# Patient Record
Sex: FEMALE | Race: WHITE | Employment: FULL TIME | URBAN - METROPOLITAN AREA
[De-identification: names, ages, dates, MRNs, and addresses within clinical notes are randomized per-mention and may not be internally consistent; named-entity substitution may affect disease eponyms.]

---

## 2023-01-13 ENCOUNTER — APPOINTMENT (OUTPATIENT)
Dept: ULTRASOUND IMAGING | Age: 28
End: 2023-01-13
Payer: COMMERCIAL

## 2023-01-13 ENCOUNTER — HOSPITAL ENCOUNTER (EMERGENCY)
Age: 28
Discharge: HOME | End: 2023-01-13
Payer: COMMERCIAL

## 2023-01-13 VITALS
OXYGEN SATURATION: 97 % | RESPIRATION RATE: 14 BRPM | BODY MASS INDEX: 26.37 KG/M2 | TEMPERATURE: 98.3 F | HEIGHT: 67 IN | SYSTOLIC BLOOD PRESSURE: 105 MMHG | HEART RATE: 80 BPM | WEIGHT: 168 LBS | DIASTOLIC BLOOD PRESSURE: 70 MMHG

## 2023-01-13 DIAGNOSIS — R11.2 NAUSEA VOMITING AND DIARRHEA: ICD-10-CM

## 2023-01-13 DIAGNOSIS — R30.0 DYSURIA: ICD-10-CM

## 2023-01-13 DIAGNOSIS — R19.7 NAUSEA VOMITING AND DIARRHEA: ICD-10-CM

## 2023-01-13 DIAGNOSIS — R10.13 ABDOMINAL PAIN, EPIGASTRIC: Primary | ICD-10-CM

## 2023-01-13 LAB
ALBUMIN SERPL-MCNC: 3.9 GM/DL (ref 3.4–5)
ALP BLD-CCNC: 53 IU/L (ref 40–128)
ALT SERPL-CCNC: 18 U/L (ref 10–40)
ANION GAP SERPL CALCULATED.3IONS-SCNC: 10 MMOL/L (ref 4–16)
AST SERPL-CCNC: 17 IU/L (ref 15–37)
BACTERIA: NEGATIVE /HPF
BASOPHILS ABSOLUTE: 0 K/CU MM
BASOPHILS RELATIVE PERCENT: 0.2 % (ref 0–1)
BILIRUB SERPL-MCNC: 0.4 MG/DL (ref 0–1)
BILIRUBIN URINE: ABNORMAL MG/DL
BLOOD, URINE: ABNORMAL
BUN BLDV-MCNC: 9 MG/DL (ref 6–23)
CALCIUM SERPL-MCNC: 8.6 MG/DL (ref 8.3–10.6)
CHLORIDE BLD-SCNC: 104 MMOL/L (ref 99–110)
CLARITY: ABNORMAL
CO2: 23 MMOL/L (ref 21–32)
COLOR: YELLOW
CREAT SERPL-MCNC: 0.8 MG/DL (ref 0.6–1.1)
DIFFERENTIAL TYPE: ABNORMAL
EOSINOPHILS ABSOLUTE: 0.1 K/CU MM
EOSINOPHILS RELATIVE PERCENT: 1.6 % (ref 0–3)
GFR SERPL CREATININE-BSD FRML MDRD: >60 ML/MIN/1.73M2
GLUCOSE BLD-MCNC: 97 MG/DL (ref 70–99)
GLUCOSE, URINE: NEGATIVE MG/DL
HCG QUALITATIVE: NEGATIVE
HCT VFR BLD CALC: 40.2 % (ref 37–47)
HEMOGLOBIN: 13.6 GM/DL (ref 12.5–16)
IMMATURE NEUTROPHIL %: 0.2 % (ref 0–0.43)
KETONES, URINE: 15 MG/DL
LEUKOCYTE ESTERASE, URINE: ABNORMAL
LIPASE: 23 IU/L (ref 13–60)
LYMPHOCYTES ABSOLUTE: 0.6 K/CU MM
LYMPHOCYTES RELATIVE PERCENT: 13.8 % (ref 24–44)
MCH RBC QN AUTO: 31.7 PG (ref 27–31)
MCHC RBC AUTO-ENTMCNC: 33.8 % (ref 32–36)
MCV RBC AUTO: 93.7 FL (ref 78–100)
MONOCYTES ABSOLUTE: 0.3 K/CU MM
MONOCYTES RELATIVE PERCENT: 6 % (ref 0–4)
MUCUS: ABNORMAL HPF
NITRITE URINE, QUANTITATIVE: NEGATIVE
NUCLEATED RBC %: 0 %
PDW BLD-RTO: 11.9 % (ref 11.7–14.9)
PH, URINE: 6 (ref 5–8)
PLATELET # BLD: 162 K/CU MM (ref 140–440)
PMV BLD AUTO: 9 FL (ref 7.5–11.1)
POTASSIUM SERPL-SCNC: 4.1 MMOL/L (ref 3.5–5.1)
PROTEIN UA: ABNORMAL MG/DL
RAPID INFLUENZA  B AGN: NEGATIVE
RAPID INFLUENZA A AGN: NEGATIVE
RBC # BLD: 4.29 M/CU MM (ref 4.2–5.4)
RBC URINE: 6 /HPF (ref 0–6)
SARS-COV-2, NAAT: NOT DETECTED
SEGMENTED NEUTROPHILS ABSOLUTE COUNT: 3.5 K/CU MM
SEGMENTED NEUTROPHILS RELATIVE PERCENT: 78.2 % (ref 36–66)
SODIUM BLD-SCNC: 137 MMOL/L (ref 135–145)
SOURCE: NORMAL
SPECIFIC GRAVITY UA: 1.02 (ref 1–1.03)
SQUAMOUS EPITHELIAL: 28 /HPF
TOTAL IMMATURE NEUTOROPHIL: 0.01 K/CU MM
TOTAL NUCLEATED RBC: 0 K/CU MM
TOTAL PROTEIN: 6.4 GM/DL (ref 6.4–8.2)
TRICHOMONAS: ABNORMAL /HPF
UROBILINOGEN, URINE: 0.2 MG/DL (ref 0.2–1)
WBC # BLD: 4.5 K/CU MM (ref 4–10.5)
WBC UA: 11 /HPF (ref 0–5)

## 2023-01-13 PROCEDURE — 85025 COMPLETE CBC W/AUTO DIFF WBC: CPT

## 2023-01-13 PROCEDURE — 76705 ECHO EXAM OF ABDOMEN: CPT

## 2023-01-13 PROCEDURE — 6370000000 HC RX 637 (ALT 250 FOR IP): Performed by: PHYSICIAN ASSISTANT

## 2023-01-13 PROCEDURE — 83690 ASSAY OF LIPASE: CPT

## 2023-01-13 PROCEDURE — 96372 THER/PROPH/DIAG INJ SC/IM: CPT

## 2023-01-13 PROCEDURE — 87804 INFLUENZA ASSAY W/OPTIC: CPT

## 2023-01-13 PROCEDURE — 6360000002 HC RX W HCPCS: Performed by: PHYSICIAN ASSISTANT

## 2023-01-13 PROCEDURE — 99284 EMERGENCY DEPT VISIT MOD MDM: CPT

## 2023-01-13 PROCEDURE — 80053 COMPREHEN METABOLIC PANEL: CPT

## 2023-01-13 PROCEDURE — 87635 SARS-COV-2 COVID-19 AMP PRB: CPT

## 2023-01-13 PROCEDURE — 84703 CHORIONIC GONADOTROPIN ASSAY: CPT

## 2023-01-13 PROCEDURE — 81001 URINALYSIS AUTO W/SCOPE: CPT

## 2023-01-13 RX ORDER — ONDANSETRON 4 MG/1
4 TABLET, FILM COATED ORAL EVERY 8 HOURS PRN
Qty: 15 TABLET | Refills: 0 | Status: SHIPPED | OUTPATIENT
Start: 2023-01-13

## 2023-01-13 RX ORDER — DICYCLOMINE HYDROCHLORIDE 10 MG/ML
20 INJECTION INTRAMUSCULAR ONCE
Status: COMPLETED | OUTPATIENT
Start: 2023-01-13 | End: 2023-01-13

## 2023-01-13 RX ORDER — ONDANSETRON 4 MG/1
4 TABLET, ORALLY DISINTEGRATING ORAL ONCE
Status: COMPLETED | OUTPATIENT
Start: 2023-01-13 | End: 2023-01-13

## 2023-01-13 RX ORDER — PHENAZOPYRIDINE HYDROCHLORIDE 100 MG/1
100 TABLET, FILM COATED ORAL 3 TIMES DAILY PRN
Qty: 9 TABLET | Refills: 0 | Status: SHIPPED | OUTPATIENT
Start: 2023-01-13 | End: 2023-01-16

## 2023-01-13 RX ORDER — FAMOTIDINE 20 MG/1
20 TABLET, FILM COATED ORAL 2 TIMES DAILY
Qty: 30 TABLET | Refills: 0 | Status: SHIPPED | OUTPATIENT
Start: 2023-01-13

## 2023-01-13 RX ORDER — DICYCLOMINE HYDROCHLORIDE 10 MG/1
10 CAPSULE ORAL 4 TIMES DAILY
Qty: 40 CAPSULE | Refills: 0 | Status: SHIPPED | OUTPATIENT
Start: 2023-01-13 | End: 2023-01-23

## 2023-01-13 RX ADMIN — ONDANSETRON 4 MG: 4 TABLET, ORALLY DISINTEGRATING ORAL at 10:22

## 2023-01-13 RX ADMIN — DICYCLOMINE HYDROCHLORIDE 20 MG: 20 INJECTION INTRAMUSCULAR at 10:22

## 2023-01-13 ASSESSMENT — PAIN SCALES - GENERAL: PAINLEVEL_OUTOF10: 4

## 2023-01-13 NOTE — ED NOTES
Discharge instructions given. Pt verbalized understanding. Pt ambulated to waiting room.         Meryle Sines, RN  01/13/23 0187

## 2023-01-13 NOTE — LETTER
[facility] Emergency Department  [address] [place] [id number]  Phone: [phone]  Fax: [phone]               January 13, 2023    Patient: Sundar Cutler   YOB: 1995   Date of Visit: 1/13/2023       To Whom It May Concern:    Oni Edmond was seen and treated in our emergency department on 1/13/2023. She may return to work on 1/14/2022.       Sincerely,       Patricia Hope RN         Signature:__________________________________

## 2023-01-13 NOTE — ED PROVIDER NOTES
eMERGENCY dEPARTMENT eNCOUnter         [facility] EMERGENCY DEPARTMENT     PCP: Carmita Peña (Inactive)    [address] [facility]    Chief Complaint   Patient presents with    Abdominal Pain     x3days    Diarrhea    Emesis       HPI    Tommy Angela is a 24 y.o. female who presents with epigastric abdominal cramping nausea vomiting and diarrhea x3 days. Context is patient states she and a child has similar symptoms approximately 3 to 4 weeks ago however her current symptoms are lasting longer. She is endorsing cramping burning pain, 6/10 in the epigastric region with multiple episodes of bilious green bile vomit and persistent nausea. She states on initial onset of symptoms, she was vomiting 6-8 times a day however in the last 24 hours, this is decreased down to 4 times a day. Is also having clear very watery diarrhea that \"feels like I am peeing. \"  Has had some dysuria without gross hematuria, flank pain or history of kidney stones. No concern for pregnancy. No abnormal vaginal discharge, odor or concern for STI. No current abdominal surgical history. Has not tried any over-the-counter medications for relief of symptoms. Does endorse a bilious episode of emesis today prior to ED arrival.  Has had subjective fever and chills. No other known recent sick contacts new foods medications or travel prior to onset of symptoms. Denying any cough nasal congestion or sore throat        REVIEW OF SYSTEMS    Constitutional:  Denies fever, chills, weight loss or weakness   HENT:  Denies sore throat or ear pain   Cardiovascular:  Denies chest pain, palpitations or swelling   Respiratory:  Denies cough or shortness of breath   GI:  See HPI above  : See HPI  Musculoskeletal:  Denies back pain or groin pain or masses. No pain or swelling of extremities.   Skin:  Denies rash  Neurologic:  Denies headache, focal weakness or sensory changes   Endocrine:  Denies polyuria or polydypsia   Lymphatic:  Denies swollen glands     All other review of systems are negative  See HPI and nursing notes for additional information     PAST MEDICAL & SURGICAL HISTORY    Past Medical History:   Diagnosis Date    GERD (gastroesophageal reflux disease)      History reviewed. No pertinent surgical history. CURRENT MEDICATIONS    Current Outpatient Rx   Medication Sig Dispense Refill    dicyclomine (BENTYL) 10 MG capsule Take 1 capsule by mouth 4 times daily for 10 days 40 capsule 0    ondansetron (ZOFRAN) 4 MG tablet Take 1 tablet by mouth every 8 hours as needed for Nausea 15 tablet 0    famotidine (PEPCID) 20 MG tablet Take 1 tablet by mouth 2 times daily 30 tablet 0    phenazopyridine (PYRIDIUM) 100 MG tablet Take 1 tablet by mouth 3 times daily as needed for Pain (dysuria) 9 tablet 0       ALLERGIES    Allergies   Allergen Reactions    Erythromycin     Shellfish Allergy        SOCIAL AND FAMILY HISTORY    Social History     Socioeconomic History    Marital status:      Spouse name: None    Number of children: None    Years of education: None    Highest education level: None   Tobacco Use    Smoking status: Every Day     Types: E-Cigarettes     History reviewed. No pertinent family history. PHYSICAL EXAM    VITAL SIGNS: /70   Pulse 80   Temp 98.3 °F (36.8 °C) (Oral)   Resp 14   Ht 5' 7\" (1.702 m)   Wt 168 lb (76.2 kg)   SpO2 97%   BMI 26.31 kg/m²   General:  Well developed, well nourished, In no acute distress  Eyes:  Sclera nonicteric, Conjunctiva moist, No discharge  Head:  Normocephalic, Atramautic  Neck/Lymphatics: Supple, no JVD, no swollen nodes  Respiratory:  Clear to ausculation bilaterally, No retractions, Non labored breathing  Cardiovascular:  RRR, No murmurs/gallops/thrills  GI:   No gross discoloration. Bowel sounds present in all quadrants, No audible bruits.  Soft,  Nondistended. + Mild to moderate epigastric/right upper quad abdominal tenderness to palpation without rebound tenderness or guarding, No palpable pulsatile masses or obvious hernias. No hepatosplenomegaly   No McBurney's point tenderness   Negative Rovsing sign   Back:  No CVA tenderness to percussion.   Musculoskeletal:  No edema, No deformity  Peripheral Vascular: Distal pulses 2+ equal bilaterally  Integument: No rash, Normal turgor  Neurologic:  Alert & oriented, Normal speech  Psychiatric: Cooperative, pleasant affect       I have reviewed and interpreted all of the currently available lab results from this visit (if applicable):  Results for orders placed or performed during the hospital encounter of 01/13/23   COVID-19, Rapid    Specimen: Nasopharyngeal   Result Value Ref Range    Source UNKNOWN     SARS-CoV-2, NAAT NOT DETECTED NOT DETECTED   Rapid influenza A/B antigens    Specimen: Nasopharyngeal   Result Value Ref Range    Rapid Influenza A Ag NEGATIVE NEGATIVE    Rapid Influenza B Ag NEGATIVE NEGATIVE   CBC with Auto Differential   Result Value Ref Range    WBC 4.5 4.0 - 10.5 K/CU MM    RBC 4.29 4.2 - 5.4 M/CU MM    Hemoglobin 13.6 12.5 - 16.0 GM/DL    Hematocrit 40.2 37 - 47 %    MCV 93.7 78 - 100 FL    MCH 31.7 (H) 27 - 31 PG    MCHC 33.8 32.0 - 36.0 %    RDW 11.9 11.7 - 14.9 %    Platelets 026 347 - 450 K/CU MM    MPV 9.0 7.5 - 11.1 FL    Differential Type AUTOMATED DIFFERENTIAL     Segs Relative 78.2 (H) 36 - 66 %    Lymphocytes % 13.8 (L) 24 - 44 %    Monocytes % 6.0 (H) 0 - 4 %    Eosinophils % 1.6 0 - 3 %    Basophils % 0.2 0 - 1 %    Segs Absolute 3.5 K/CU MM    Lymphocytes Absolute 0.6 K/CU MM    Monocytes Absolute 0.3 K/CU MM    Eosinophils Absolute 0.1 K/CU MM    Basophils Absolute 0.0 K/CU MM    Nucleated RBC % 0.0 %    Total Nucleated RBC 0.0 K/CU MM    Total Immature Neutrophil 0.01 K/CU MM    Immature Neutrophil % 0.2 0 - 0.43 %   Comprehensive Metabolic Panel w/ Reflex to MG   Result Value Ref Range    Sodium 137 135 - 145 MMOL/L    Potassium 4.1 3.5 - 5.1 MMOL/L    Chloride 104 99 - 110 mMol/L    CO2 23 21 - 32 MMOL/L    BUN 9 6 - 23 MG/DL    Creatinine 0.8 0.6 - 1.1 MG/DL    Est, Glom Filt Rate >60 >60 mL/min/1.73m2    Glucose 97 70 - 99 MG/DL    Calcium 8.6 8.3 - 10.6 MG/DL    Albumin 3.9 3.4 - 5.0 GM/DL    Total Protein 6.4 6.4 - 8.2 GM/DL    Total Bilirubin 0.4 0.0 - 1.0 MG/DL    ALT 18 10 - 40 U/L    AST 17 15 - 37 IU/L    Alkaline Phosphatase 53 40 - 128 IU/L    Anion Gap 10 4 - 16   Lipase   Result Value Ref Range    Lipase 23 13 - 60 IU/L   HCG Serum, Qualitative   Result Value Ref Range    hCG Qual NEGATIVE    Urinalysis   Result Value Ref Range    Color, UA YELLOW YELLOW    Clarity, UA CLOUDY (A) CLEAR    Glucose, Urine NEGATIVE NEGATIVE MG/DL    Bilirubin Urine SMALL NUMBER OR AMOUNT OBSERVED (A) NEGATIVE MG/DL    Ketones, Urine 15 (A) NEGATIVE MG/DL    Specific Gravity, UA 1.025 1.001 - 1.035    Blood, Urine MODERATE NUMBER OR AMOUNT OBSERVED (A) NEGATIVE    pH, Urine 6.0 5.0 - 8.0    Protein, UA TRACE (A) NEGATIVE MG/DL    Urobilinogen, Urine 0.2 0.2 - 1.0 MG/DL    Nitrite Urine, Quantitative NEGATIVE NEGATIVE    Leukocyte Esterase, Urine MODERATE NUMBER OR AMOUNT OBSERVED (A) NEGATIVE   Microscopic Urinalysis   Result Value Ref Range    RBC, UA 6 0 - 6 /HPF    WBC, UA 11 (H) 0 - 5 /HPF    Bacteria, UA NEGATIVE NEGATIVE /HPF    Squam Epithel, UA 28 /HPF    Mucus, UA OCCASIONAL (A) NEGATIVE HPF    Trichomonas, UA NONE SEEN NONE SEEN /HPF        RADIOLOGY/PROCEDURES       US ABDOMEN LIMITED (Final result)  Result time 01/13/23 13:26:44  Final result by González Baker MD (01/13/23 13:26:44)                Impression:    Unremarkable right upper quadrant ultrasound. No sonographic evidence of   cholelithiasis or acute cholecystitis seen. Narrative:    EXAMINATION:   RIGHT UPPER QUADRANT ULTRASOUND     1/13/2023 11:29 am     COMPARISON:   CT abdomen pelvis dated 04/14/2013.      HISTORY:   ORDERING SYSTEM PROVIDED HISTORY: RUQ pain   TECHNOLOGIST PROVIDED HISTORY: Reason for exam:->RUQ pain     FINDINGS:   LIVER:  The liver demonstrates normal echogenicity without evidence of   intrahepatic biliary ductal dilatation. BILIARY SYSTEM:  Gallbladder is unremarkable without evidence of   pericholecystic fluid, wall thickening or stones. Negative sonographic   Rider's sign. Common bile duct is within normal limits measuring 3 mm. RIGHT KIDNEY: The right kidney is grossly unremarkable without evidence of   hydronephrosis. PANCREAS:  Visualized portions of the pancreas are unremarkable. OTHER: No evidence of right upper quadrant ascites. ED COURSE & MEDICAL DECISION MAKING      Vital signs and nursing notes reviewed during ED course. I have independently evaluated this patient . Supervising MD - Dr Eils Samuel - present in the Emergency Department, available for consultation, throughout entirety of  patient care. All pertinent Lab data and radiographic results reviewed with patient at bedside. The patient and / or the family were informed of the results of any tests, a time was given to answer questions, a plan was proposed and they agreed with plan. Differential diagnosis: Abdominal Aortic Aneurysm, Ischemic Bowel, Bowel Obstruction, Acute Cholecystitis, Acute Appendicitis, other    Clinical  IMPRESSION    1. Abdominal pain, epigastric    2. Nausea vomiting and diarrhea    3. Dysuria          Patient presents with 3-day history of generalized epigastric abdominal pain nausea vomiting and diarrhea. On exam, pleasant nontoxic 81-year-old female, afebrile, no acute respiratory distress. Was noted mildly tachycardic at triage vital signs however has normal heart rate exam.  Appears well-hydrated. Unremarkable cardiopulmonary exam.  Abdominal exam is nonsurgical.  Mild tenderness to deep palpation epigastric right upper quadrant without hepatosplenomegaly, rebound or guarding.   No other localized tenderness in the right lower quadrant [place]'s point, no peritoneal signs. No CVA tenderness. Patient is given ODT Zofran and IM Bentyl in the ED. labs today reassuring. CBC, CMP without significant derangement. Normal renal function electrolytes. Lipase within normal limits. Serum pregnancy is negative. Rapid influenza and rapid COVID are both negative. UA shows small amount of bilirubin and ketones, moderate gross blood with only 6 red blood cells, 11 white blood cells negative bacteria. However sample is contaminated with 28 squamous epithelial cells. Although patient is having dysuria complaints, given the amount of contamination, we discussed awaiting urine culture results prior to initiation of additional antibiotics especially in light of her diarrhea and antibiotics can worsen her diarrhea symptoms. She is comfortable and agree with this plan. Serial abdominal scans can be nonsurgical and given length of symptoms, reassuring abdominal exam and lab work, do feel risk with the benefits of further abdominal imaging at this time. We discussed possible viral etiology for gastritis/gastroenteritis complaints. We discussed the supportive symptomatic care and diet modifications and close follow-up with family doctor back in the ED for serial abdominal exams as cannot completely rule out other etiology earliness process. Patient is comfortable and agreeable this plan. I estimate there is low risk for acute appendicitis, bowel obstruction, cholecystitis, ruptured diverticulitis, incarcerated hernia, hemorrhagic pancreatitis, or perforated bowel/ulcer, ectopic pregnancy, ovarian torsion or tubo-ovarian ovarian abscess thus I consider the discharge disposition reasonable. Patient is discharge stable condition with Bentyl, Zofran Pepcid and Pyridium. Encouraged rest, push clear fluids, bland/brat diet with gradual advancement to normal diet as tolerated. Discussed follow-up with family doctor for serial abdominal exams.   I discussed the unclear etiology of patient's symptoms today and the need for return to emergency department in 8-12 hours for repeat evaluation, sooner if symptoms worsen or any new symptoms develop. Patient agrees to return emergency department if symptoms worsen or any new symptoms develop. Comment: Please note this report has been produced using speech recognition software and may contain errors related to that system including errors in grammar, punctuation, and spelling, as well as words and phrases that may be inappropriate. If there are any questions or concerns please feel free to contact the dictating provider for clarification.           Kayla Troy PA-C  01/13/23 [id number]